# Patient Record
Sex: FEMALE | Race: WHITE | NOT HISPANIC OR LATINO | Employment: UNEMPLOYED | URBAN - METROPOLITAN AREA
[De-identification: names, ages, dates, MRNs, and addresses within clinical notes are randomized per-mention and may not be internally consistent; named-entity substitution may affect disease eponyms.]

---

## 2020-12-18 ENCOUNTER — TELEPHONE (OUTPATIENT)
Dept: CARDIOLOGY | Facility: CLINIC | Age: 25
End: 2020-12-18

## 2020-12-18 NOTE — TELEPHONE ENCOUNTER
Received a referral from Ira Philippe for patient with abnormal ECG. Called patient to schedule appointment, scheduled on 01/12/2021 at 1500.  RADHA ANDINO LPN

## 2021-01-12 ENCOUNTER — OFFICE VISIT (OUTPATIENT)
Dept: CARDIOLOGY | Facility: CLINIC | Age: 26
End: 2021-01-12
Payer: MEDICAID

## 2021-01-12 VITALS
HEART RATE: 89 BPM | SYSTOLIC BLOOD PRESSURE: 119 MMHG | DIASTOLIC BLOOD PRESSURE: 64 MMHG | WEIGHT: 110.81 LBS | OXYGEN SATURATION: 100 % | BODY MASS INDEX: 20.39 KG/M2 | TEMPERATURE: 98 F | HEIGHT: 62 IN | RESPIRATION RATE: 13 BRPM

## 2021-01-12 DIAGNOSIS — R42 ORTHOSTATIC DIZZINESS: ICD-10-CM

## 2021-01-12 DIAGNOSIS — G47.19 EXCESSIVE DAYTIME SLEEPINESS: ICD-10-CM

## 2021-01-12 DIAGNOSIS — R51.9 CHRONIC DAILY HEADACHE: ICD-10-CM

## 2021-01-12 DIAGNOSIS — R53.82 CHRONIC FATIGUE: Primary | ICD-10-CM

## 2021-01-12 DIAGNOSIS — F12.20 MARIJUANA DEPENDENCE: ICD-10-CM

## 2021-01-12 DIAGNOSIS — F90.0 ATTENTION DEFICIT HYPERACTIVITY DISORDER (ADHD), PREDOMINANTLY INATTENTIVE TYPE: ICD-10-CM

## 2021-01-12 DIAGNOSIS — R63.4 UNEXPLAINED WEIGHT LOSS: ICD-10-CM

## 2021-01-12 DIAGNOSIS — Z79.1 NSAID LONG-TERM USE: ICD-10-CM

## 2021-01-12 PROCEDURE — 93005 ELECTROCARDIOGRAM TRACING: CPT | Mod: PBBFAC,PN | Performed by: INTERNAL MEDICINE

## 2021-01-12 PROCEDURE — 99999 PR PBB SHADOW E&M-EST. PATIENT-LVL IV: ICD-10-PCS | Mod: PBBFAC,,, | Performed by: INTERNAL MEDICINE

## 2021-01-12 PROCEDURE — 99999 PR PBB SHADOW E&M-EST. PATIENT-LVL IV: CPT | Mod: PBBFAC,,, | Performed by: INTERNAL MEDICINE

## 2021-01-12 PROCEDURE — 99244 PR OFFICE CONSULTATION,LEVEL IV: ICD-10-PCS | Mod: S$PBB,,, | Performed by: INTERNAL MEDICINE

## 2021-01-12 PROCEDURE — 93010 EKG 12-LEAD: ICD-10-PCS | Mod: S$PBB,,, | Performed by: INTERNAL MEDICINE

## 2021-01-12 PROCEDURE — 99244 OFF/OP CNSLTJ NEW/EST MOD 40: CPT | Mod: S$PBB,,, | Performed by: INTERNAL MEDICINE

## 2021-01-12 PROCEDURE — 99214 OFFICE O/P EST MOD 30 MIN: CPT | Mod: PBBFAC,PN | Performed by: INTERNAL MEDICINE

## 2021-01-12 PROCEDURE — 93010 ELECTROCARDIOGRAM REPORT: CPT | Mod: S$PBB,,, | Performed by: INTERNAL MEDICINE

## 2021-01-12 RX ORDER — DEXTROAMPHETAMINE SACCHARATE, AMPHETAMINE ASPARTATE, DEXTROAMPHETAMINE SULFATE AND AMPHETAMINE SULFATE 7.5; 7.5; 7.5; 7.5 MG/1; MG/1; MG/1; MG/1
TABLET ORAL
COMMUNITY
Start: 2020-10-17 | End: 2023-04-06

## 2021-09-08 ENCOUNTER — TELEPHONE (OUTPATIENT)
Dept: OBSTETRICS AND GYNECOLOGY | Facility: CLINIC | Age: 26
End: 2021-09-08

## 2021-09-08 RX ORDER — FLUCONAZOLE 150 MG/1
TABLET ORAL
Qty: 2 TABLET | Refills: 1 | Status: SHIPPED | OUTPATIENT
Start: 2021-09-08 | End: 2023-04-06

## 2021-09-09 ENCOUNTER — TELEPHONE (OUTPATIENT)
Dept: OBSTETRICS AND GYNECOLOGY | Facility: CLINIC | Age: 26
End: 2021-09-09

## 2021-09-14 ENCOUNTER — OFFICE VISIT (OUTPATIENT)
Dept: OBSTETRICS AND GYNECOLOGY | Facility: CLINIC | Age: 26
End: 2021-09-14
Payer: MEDICAID

## 2021-09-14 ENCOUNTER — LAB VISIT (OUTPATIENT)
Dept: LAB | Facility: HOSPITAL | Age: 26
End: 2021-09-14
Attending: NURSE PRACTITIONER
Payer: MEDICAID

## 2021-09-14 VITALS
BODY MASS INDEX: 20.61 KG/M2 | HEIGHT: 62 IN | DIASTOLIC BLOOD PRESSURE: 82 MMHG | WEIGHT: 112 LBS | SYSTOLIC BLOOD PRESSURE: 124 MMHG

## 2021-09-14 DIAGNOSIS — Z00.00 ANNUAL VISIT FOR GENERAL ADULT MEDICAL EXAMINATION WITHOUT ABNORMAL FINDINGS: ICD-10-CM

## 2021-09-14 DIAGNOSIS — F32.A DEPRESSION, UNSPECIFIED DEPRESSION TYPE: ICD-10-CM

## 2021-09-14 DIAGNOSIS — Z00.00 ANNUAL VISIT FOR GENERAL ADULT MEDICAL EXAMINATION WITHOUT ABNORMAL FINDINGS: Primary | ICD-10-CM

## 2021-09-14 LAB
ESTIMATED AVG GLUCOSE: 103 MG/DL (ref 68–131)
HBA1C MFR BLD: 5.2 % (ref 4–5.6)

## 2021-09-14 PROCEDURE — 86803 HEPATITIS C AB TEST: CPT | Performed by: OBSTETRICS & GYNECOLOGY

## 2021-09-14 PROCEDURE — 87389 HIV-1 AG W/HIV-1&-2 AB AG IA: CPT | Performed by: OBSTETRICS & GYNECOLOGY

## 2021-09-14 PROCEDURE — 87340 HEPATITIS B SURFACE AG IA: CPT | Performed by: OBSTETRICS & GYNECOLOGY

## 2021-09-14 PROCEDURE — 86592 SYPHILIS TEST NON-TREP QUAL: CPT | Performed by: OBSTETRICS & GYNECOLOGY

## 2021-09-14 PROCEDURE — 87624 HPV HI-RISK TYP POOLED RSLT: CPT | Performed by: OBSTETRICS & GYNECOLOGY

## 2021-09-14 PROCEDURE — 87481 CANDIDA DNA AMP PROBE: CPT | Mod: 59 | Performed by: OBSTETRICS & GYNECOLOGY

## 2021-09-14 PROCEDURE — 87491 CHLMYD TRACH DNA AMP PROBE: CPT | Mod: 59 | Performed by: OBSTETRICS & GYNECOLOGY

## 2021-09-14 PROCEDURE — 83036 HEMOGLOBIN GLYCOSYLATED A1C: CPT | Performed by: OBSTETRICS & GYNECOLOGY

## 2021-09-14 PROCEDURE — 88175 CYTOPATH C/V AUTO FLUID REDO: CPT | Performed by: OBSTETRICS & GYNECOLOGY

## 2021-09-14 PROCEDURE — 36415 COLL VENOUS BLD VENIPUNCTURE: CPT | Performed by: OBSTETRICS & GYNECOLOGY

## 2021-09-14 PROCEDURE — 99395 PREV VISIT EST AGE 18-39: CPT | Mod: S$GLB,,, | Performed by: OBSTETRICS & GYNECOLOGY

## 2021-09-14 PROCEDURE — 87661 TRICHOMONAS VAGINALIS AMPLIF: CPT | Mod: 59 | Performed by: OBSTETRICS & GYNECOLOGY

## 2021-09-14 PROCEDURE — 87591 N.GONORRHOEAE DNA AMP PROB: CPT | Performed by: OBSTETRICS & GYNECOLOGY

## 2021-09-14 PROCEDURE — 99395 PR PREVENTIVE VISIT,EST,18-39: ICD-10-PCS | Mod: S$GLB,,, | Performed by: OBSTETRICS & GYNECOLOGY

## 2021-09-14 RX ORDER — NORETHINDRONE ACETATE AND ETHINYL ESTRADIOL 1.5-30(21)
1 KIT ORAL DAILY
Qty: 30 TABLET | Refills: 11 | Status: SHIPPED | OUTPATIENT
Start: 2021-09-14 | End: 2023-01-30 | Stop reason: SDUPTHER

## 2021-09-14 RX ORDER — ESCITALOPRAM OXALATE 10 MG/1
10 TABLET ORAL DAILY
Qty: 30 TABLET | Refills: 2 | Status: SHIPPED | OUTPATIENT
Start: 2021-09-14 | End: 2023-04-06

## 2021-09-15 LAB
HBV SURFACE AG SERPL QL IA: NEGATIVE
HCV AB SERPL QL IA: NEGATIVE
HIV 1+2 AB+HIV1 P24 AG SERPL QL IA: NEGATIVE
RPR SER QL: NORMAL

## 2021-09-16 ENCOUNTER — TELEPHONE (OUTPATIENT)
Dept: OBSTETRICS AND GYNECOLOGY | Facility: CLINIC | Age: 26
End: 2021-09-16

## 2021-09-16 LAB
BACTERIAL VAGINOSIS DNA: NEGATIVE
C TRACH DNA SPEC QL NAA+PROBE: NOT DETECTED
CANDIDA GLABRATA DNA: NEGATIVE
CANDIDA KRUSEI DNA: NEGATIVE
CANDIDA RRNA VAG QL PROBE: NEGATIVE
N GONORRHOEA DNA SPEC QL NAA+PROBE: NOT DETECTED
T VAGINALIS RRNA GENITAL QL PROBE: NEGATIVE

## 2021-09-23 ENCOUNTER — PATIENT MESSAGE (OUTPATIENT)
Dept: OBSTETRICS AND GYNECOLOGY | Facility: CLINIC | Age: 26
End: 2021-09-23

## 2021-09-23 LAB
FINAL PATHOLOGIC DIAGNOSIS: NORMAL
Lab: NORMAL

## 2021-09-24 ENCOUNTER — TELEPHONE (OUTPATIENT)
Dept: OBSTETRICS AND GYNECOLOGY | Facility: CLINIC | Age: 26
End: 2021-09-24

## 2021-09-27 LAB
HPV HR 12 DNA SPEC QL NAA+PROBE: NEGATIVE
HPV16 AG SPEC QL: NEGATIVE
HPV18 DNA SPEC QL NAA+PROBE: NEGATIVE

## 2022-02-17 ENCOUNTER — TELEPHONE (OUTPATIENT)
Dept: OBSTETRICS AND GYNECOLOGY | Facility: CLINIC | Age: 27
End: 2022-02-17
Payer: MEDICAID

## 2022-02-17 NOTE — TELEPHONE ENCOUNTER
----- Message from Luz Lala sent at 2/17/2022 10:38 AM CST -----  Type:  RX Refill Request    Who Called:  Pt  Refill or New Rx:  Refill  RX Name and Strength:  norethindrone-ethinyl estradiol-iron (MICROGESTIN FE1.5/30) 1.5 mg-30 mcg (21)/75 mg (7) tablet  How is the patient currently taking it? (ex. 1XDay):  As directed  Is this a 30 day or 90 day RX:  30  Preferred Pharmacy with phone number:   [facility] - [place], [place] - [address]  [address]  [place] [address]  Phone: [phone] Fax: [phone]  Local or Mail Order:  Local  Ordering Provider:  Paloma Bland Call Back Number:  [phone]  Additional Information:  Pt has been out since Monday 02/14 and is requesting return call to advise on 3 day gap--please advise--Thank you

## 2022-02-18 ENCOUNTER — TELEPHONE (OUTPATIENT)
Dept: OBSTETRICS AND GYNECOLOGY | Facility: CLINIC | Age: 27
End: 2022-02-18
Payer: MEDICAID

## 2022-02-18 NOTE — TELEPHONE ENCOUNTER
Birth Control refilled per Dr. Dow. Pt notified and spoke to Cathy Philippe about starting her pack because she missed a few days. Pt ADAMARIS.   
No

## 2022-08-29 ENCOUNTER — TELEPHONE (OUTPATIENT)
Dept: OBSTETRICS AND GYNECOLOGY | Facility: CLINIC | Age: 27
End: 2022-08-29
Payer: MEDICAID
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
## 2023-01-25 ENCOUNTER — PATIENT MESSAGE (OUTPATIENT)
Dept: OBSTETRICS AND GYNECOLOGY | Facility: CLINIC | Age: 28
End: 2023-01-25
Payer: MEDICAID

## 2023-01-25 ENCOUNTER — TELEPHONE (OUTPATIENT)
Dept: OBSTETRICS AND GYNECOLOGY | Facility: CLINIC | Age: 28
End: 2023-01-25
Payer: MEDICAID

## 2023-01-25 NOTE — TELEPHONE ENCOUNTER
----- Message from Cristel Jean-Baptiste sent at 2023 10:50 AM CST -----  Contact: PT  Type:  RX Refill Request    Who Called: PT   Refill or New Rx:REFILL   RX Name and Strength:  EScitalopram oxalate (LEXAPRO) 10 MG tablet -     How is the patient currently taking it? (ex. 1XDay): ONCE A DAY  Is this a 30 day or 90 day RX:90  Preferred Pharmacy with phone number:  [facility] - [place], [place] - [address]  [address]  [place] [address]  Phone: [phone] Fax: [phone]    Local or Mail Order:LOCAL  Ordering Provider:GERARD   Would the patient rather a call back or a response via [facility]? CALL   Best Call Back Number:[phone] (home)     Additional Information: RX IS  PLEASE REFILL OR CALL PT TO ADVISE. THANK YOU

## 2023-01-30 ENCOUNTER — OFFICE VISIT (OUTPATIENT)
Dept: OBSTETRICS AND GYNECOLOGY | Facility: CLINIC | Age: 28
End: 2023-01-30
Payer: MEDICAID

## 2023-01-30 VITALS
DIASTOLIC BLOOD PRESSURE: 54 MMHG | WEIGHT: 111.38 LBS | HEIGHT: 62 IN | BODY MASS INDEX: 20.5 KG/M2 | SYSTOLIC BLOOD PRESSURE: 102 MMHG

## 2023-01-30 DIAGNOSIS — Z00.00 ANNUAL VISIT FOR GENERAL ADULT MEDICAL EXAMINATION WITHOUT ABNORMAL FINDINGS: ICD-10-CM

## 2023-01-30 DIAGNOSIS — Z01.419 ENCOUNTER FOR ANNUAL ROUTINE GYNECOLOGICAL EXAMINATION: Primary | ICD-10-CM

## 2023-01-30 PROCEDURE — 3008F PR BODY MASS INDEX (BMI) DOCUMENTED: ICD-10-PCS | Mod: CPTII,S$GLB,, | Performed by: OBSTETRICS & GYNECOLOGY

## 2023-01-30 PROCEDURE — 3074F SYST BP LT 130 MM HG: CPT | Mod: CPTII,S$GLB,, | Performed by: OBSTETRICS & GYNECOLOGY

## 2023-01-30 PROCEDURE — 87591 N.GONORRHOEAE DNA AMP PROB: CPT | Performed by: OBSTETRICS & GYNECOLOGY

## 2023-01-30 PROCEDURE — 99395 PR PREVENTIVE VISIT,EST,18-39: ICD-10-PCS | Mod: S$GLB,,, | Performed by: OBSTETRICS & GYNECOLOGY

## 2023-01-30 PROCEDURE — 88141 PR  CYTOPATH CERV/VAG INTERPRET: ICD-10-PCS | Mod: ,,, | Performed by: PATHOLOGY

## 2023-01-30 PROCEDURE — 81514 NFCT DS BV&VAGINITIS DNA ALG: CPT | Performed by: OBSTETRICS & GYNECOLOGY

## 2023-01-30 PROCEDURE — 1159F PR MEDICATION LIST DOCUMENTED IN MEDICAL RECORD: ICD-10-PCS | Mod: CPTII,S$GLB,, | Performed by: OBSTETRICS & GYNECOLOGY

## 2023-01-30 PROCEDURE — 88142 CYTOPATH C/V THIN LAYER: CPT | Performed by: PATHOLOGY

## 2023-01-30 PROCEDURE — 3078F PR MOST RECENT DIASTOLIC BLOOD PRESSURE < 80 MM HG: ICD-10-PCS | Mod: CPTII,S$GLB,, | Performed by: OBSTETRICS & GYNECOLOGY

## 2023-01-30 PROCEDURE — 99395 PREV VISIT EST AGE 18-39: CPT | Mod: S$GLB,,, | Performed by: OBSTETRICS & GYNECOLOGY

## 2023-01-30 PROCEDURE — 3008F BODY MASS INDEX DOCD: CPT | Mod: CPTII,S$GLB,, | Performed by: OBSTETRICS & GYNECOLOGY

## 2023-01-30 PROCEDURE — 88141 CYTOPATH C/V INTERPRET: CPT | Mod: ,,, | Performed by: PATHOLOGY

## 2023-01-30 PROCEDURE — 3078F DIAST BP <80 MM HG: CPT | Mod: CPTII,S$GLB,, | Performed by: OBSTETRICS & GYNECOLOGY

## 2023-01-30 PROCEDURE — 1159F MED LIST DOCD IN RCRD: CPT | Mod: CPTII,S$GLB,, | Performed by: OBSTETRICS & GYNECOLOGY

## 2023-01-30 PROCEDURE — 3074F PR MOST RECENT SYSTOLIC BLOOD PRESSURE < 130 MM HG: ICD-10-PCS | Mod: CPTII,S$GLB,, | Performed by: OBSTETRICS & GYNECOLOGY

## 2023-01-30 RX ORDER — NORETHINDRONE ACETATE AND ETHINYL ESTRADIOL 1.5-30(21)
1 KIT ORAL DAILY
Qty: 30 TABLET | Refills: 11 | Status: SHIPPED | OUTPATIENT
Start: 2023-01-30 | End: 2024-01-30

## 2023-01-30 NOTE — PROGRESS NOTES
CC: Well woman exam    Tory Dale is a 27 y.o. female  presents for well woman exam.  LMP: No LMP recorded (lmp unknown)..  No issues, problems, or complaints. Patients last pap was 21 WNL.  Last wellness labs were done a year ago.  She is a non smoker . Denies any anxiety and depression. She uses a seat belt while riding in the car.  Eating well and exercising.  yes sexually active.The current method of family planning is OCP (estrogen/progesterone).       Chief Complaint   Patient presents with    Annual Exam     Pt is here for an annual exam visit. Pt is requesting a refill on birth control pills        Past Medical History:   Diagnosis Date    HPV (human papilloma virus) anogenital infection      Past Surgical History:   Procedure Laterality Date    Breast inplants       Social History     Socioeconomic History    Marital status:    Tobacco Use    Smoking status: Never    Smokeless tobacco: Never   Substance and Sexual Activity    Alcohol use: Never    Drug use: Yes     Types: Marijuana    Sexual activity: Yes     Birth control/protection: Implant     Family History   Problem Relation Age of Onset    Hypertension Mother     Hypertension Father      OB History          3    Para   3    Term   2       1    AB        Living   3         SAB        IAB        Ectopic        Multiple        Live Births   3               Current Outpatient Medications on File Prior to Visit   Medication Sig Dispense Refill    dextroamphetamine-amphetamine 30 mg Tab TAKE 1/2 TABLET BY MOUTH THREE TIMES DAILY.      EScitalopram oxalate (LEXAPRO) 10 MG tablet Take 1 tablet (10 mg total) by mouth once daily. 30 tablet 2    fluconazole (DIFLUCAN) 150 MG Tab 1 po Now may repeat in 72 hr (Patient not taking: Reported on 2021) 2 tablet 1    [DISCONTINUED] norethindrone-ethinyl estradiol-iron (MICROGESTIN FE1.5/30) 1.5 mg-30 mcg (21)/75 mg (7) tablet Take 1 tablet by mouth once daily. 30 tablet 11  "    No current facility-administered medications on file prior to visit.        ROS:  Review of Systems   Constitutional:  Negative for fatigue, fever and unexpected weight change.   HENT:  Negative for nasal congestion.    Respiratory:  Negative for cough and shortness of breath.    Cardiovascular:  Negative for chest pain, palpitations and leg swelling.   Gastrointestinal:  Negative for abdominal pain, constipation, diarrhea, nausea, vomiting and reflux.   Endocrine: Negative for diabetes, hair loss and hot flashes.   Genitourinary:  Negative for bladder incontinence, decreased libido, dysmenorrhea, dyspareunia, dysuria, hot flashes, menorrhagia, menstrual problem, pelvic pain, vaginal discharge and vaginal dryness.   Musculoskeletal:  Negative for arthralgias, back pain and myalgias.   Integumentary:  Negative for rash, acne, hair changes, mole/lesion, breast mass, nipple discharge, breast skin changes and breast tenderness.   Neurological:  Negative for seizures, syncope and headaches.   Hematological:  Negative for adenopathy. Does not bruise/bleed easily.   Psychiatric/Behavioral:  Negative for depression and sleep disturbance. The patient is not nervous/anxious.    Breast: Negative for breast self exam, lump, mass, mastodynia, nipple discharge, skin changes and tenderness     BP (!) 102/54   Ht 5' 2" (1.575 m)   Wt 50.5 kg (111 lb 6.4 oz)   LMP  (LMP Unknown) Comment: 2 weeks ago  BMI 20.38 kg/m²     PHYSICAL EXAM:  Physical Exam:   Constitutional: She is oriented to person, place, and time. She appears well-developed and well-nourished. No distress.    HENT:   Head: Normocephalic and atraumatic.   Nose: Nose normal.    Eyes: Pupils are equal, round, and reactive to light. Conjunctivae and EOM are normal.    Neck: No thyromegaly present.    Cardiovascular:  Normal rate, regular rhythm and normal heart sounds.      Exam reveals no clubbing, no cyanosis and no edema.        Pulmonary/Chest: Effort normal " and breath sounds normal. She has no wheezes.        Abdominal: Soft. Bowel sounds are normal. She exhibits no distension and no mass. There is no abdominal tenderness. There is no rebound and no guarding.     Genitourinary:    Inguinal canal, vagina, uterus, right adnexa and left adnexa normal.   Labial bartholins normal.There is no rash or lesion on the right labia. There is no rash or lesion on the left labia. Cervix is normal. Right adnexum displays no mass and no tenderness. Left adnexum displays no mass and no tenderness. No  no vaginal discharge, tenderness, rectocele, cystocele or unspecified prolapse of vaginal walls in the vagina. Cervix exhibits no motion tenderness and no discharge.           Musculoskeletal: Normal range of motion and moves all extremeties. No edema.       Neurological: She is alert and oriented to person, place, and time. She has normal reflexes.    Skin: Skin is warm and dry. No rash noted. No cyanosis. Nails show no clubbing.    Psychiatric: She has a normal mood and affect. Her behavior is normal. Judgment and thought content normal.      Encounter for annual routine gynecological examination  -     Liquid-Based Pap Smear, Screening  -     C. trachomatis/N. gonorrhoeae by AMP DNA [facility]; Vagina  -     Vaginosis Screen by DNA Probe    Annual visit for general adult medical examination without abnormal findings  -     norethindrone-ethinyl estradiol-iron (MICROGESTIN FE1.5/30) 1.5 mg-30 mcg (21)/75 mg (7) tablet; Take 1 tablet by mouth once daily.  Dispense: 30 tablet; Refill: 11      Orders Placed This Encounter   Procedures    C. trachomatis/N. gonorrhoeae by AMP DNA [facility]; Vagina     Sources by Resulting Lab:->[facility]     Order Specific Question:   Sources by Resulting Lab:     Answer:   [facility]     Order Specific Question:   Source:     Answer:   Vagina    Vaginosis Screen by DNA Probe     Release to patient->Immediate     Order Specific Question:   Release to patient      Answer:   Immediate        Patient was counseled today on A.C.S. Pap guidelines and recommendations for yearly pelvic exams, mammograms and monthly self breast exams; to see her PCP for other health maintenance.  Contraception was discussed with the patient she expressed understanding.  STD education counseling was performed during this visit patient expressed understanding.    No follow-ups on file.

## 2023-02-01 LAB
C TRACH DNA SPEC QL NAA+PROBE: NOT DETECTED
N GONORRHOEA DNA SPEC QL NAA+PROBE: NOT DETECTED

## 2023-02-04 LAB
BACTERIAL VAGINOSIS DNA: NEGATIVE
CANDIDA GLABRATA DNA: NEGATIVE
CANDIDA KRUSEI DNA: NEGATIVE
CANDIDA RRNA VAG QL PROBE: NEGATIVE
T VAGINALIS RRNA GENITAL QL PROBE: NEGATIVE

## 2023-02-06 LAB
FINAL PATHOLOGIC DIAGNOSIS: NORMAL
Lab: NORMAL

## 2023-04-06 ENCOUNTER — OFFICE VISIT (OUTPATIENT)
Dept: OBSTETRICS AND GYNECOLOGY | Facility: CLINIC | Age: 28
End: 2023-04-06
Payer: MEDICAID

## 2023-04-06 VITALS
HEIGHT: 62 IN | BODY MASS INDEX: 20.36 KG/M2 | DIASTOLIC BLOOD PRESSURE: 96 MMHG | SYSTOLIC BLOOD PRESSURE: 144 MMHG | WEIGHT: 110.63 LBS

## 2023-04-06 DIAGNOSIS — N92.0 MENORRHAGIA WITH REGULAR CYCLE: Primary | ICD-10-CM

## 2023-04-06 PROBLEM — F98.8 ATTENTION DEFICIT DISORDER (ADD) WITHOUT HYPERACTIVITY: Status: ACTIVE | Noted: 2021-01-12

## 2023-04-06 PROBLEM — Z79.1 NSAID LONG-TERM USE: Status: RESOLVED | Noted: 2021-01-12 | Resolved: 2023-04-06

## 2023-04-06 PROBLEM — F90.0 ATTENTION DEFICIT HYPERACTIVITY DISORDER (ADHD), PREDOMINANTLY INATTENTIVE TYPE: Status: RESOLVED | Noted: 2021-01-12 | Resolved: 2023-04-06

## 2023-04-06 PROBLEM — R63.4 UNEXPLAINED WEIGHT LOSS: Status: RESOLVED | Noted: 2021-01-12 | Resolved: 2023-04-06

## 2023-04-06 PROBLEM — G47.19 EXCESSIVE DAYTIME SLEEPINESS: Status: RESOLVED | Noted: 2021-01-12 | Resolved: 2023-04-06

## 2023-04-06 PROCEDURE — 3077F SYST BP >= 140 MM HG: CPT | Mod: CPTII,S$GLB,,

## 2023-04-06 PROCEDURE — 1159F MED LIST DOCD IN RCRD: CPT | Mod: CPTII,S$GLB,,

## 2023-04-06 PROCEDURE — 3080F DIAST BP >= 90 MM HG: CPT | Mod: CPTII,S$GLB,,

## 2023-04-06 PROCEDURE — 1159F PR MEDICATION LIST DOCUMENTED IN MEDICAL RECORD: ICD-10-PCS | Mod: CPTII,S$GLB,,

## 2023-04-06 PROCEDURE — 3008F PR BODY MASS INDEX (BMI) DOCUMENTED: ICD-10-PCS | Mod: CPTII,S$GLB,,

## 2023-04-06 PROCEDURE — 99213 OFFICE O/P EST LOW 20 MIN: CPT | Mod: S$GLB,,,

## 2023-04-06 PROCEDURE — 99213 PR OFFICE/OUTPT VISIT, EST, LEVL III, 20-29 MIN: ICD-10-PCS | Mod: S$GLB,,,

## 2023-04-06 PROCEDURE — 3080F PR MOST RECENT DIASTOLIC BLOOD PRESSURE >= 90 MM HG: ICD-10-PCS | Mod: CPTII,S$GLB,,

## 2023-04-06 PROCEDURE — 3077F PR MOST RECENT SYSTOLIC BLOOD PRESSURE >= 140 MM HG: ICD-10-PCS | Mod: CPTII,S$GLB,,

## 2023-04-06 PROCEDURE — 3008F BODY MASS INDEX DOCD: CPT | Mod: CPTII,S$GLB,,

## 2023-04-06 RX ORDER — TRANEXAMIC ACID 650 MG/1
1300 TABLET ORAL 3 TIMES DAILY
Qty: 30 TABLET | Refills: 0 | Status: SHIPPED | OUTPATIENT
Start: 2023-04-06 | End: 2023-04-11

## 2023-04-06 NOTE — PROGRESS NOTES
HISTORY OF PRESENT ILLNESS:    Tory Dale is a 27 y.o. female, , Patient's last menstrual period was 2023 (approximate).,  presents for a problem visit, complaining of heavy period x 1 month. Patient is currently taking  OCPs. She states she is doing well with these. However last month her period began and it has yet to end. PMH notable for previous HPV infection and history of ovarian cysts. She was seen for wellness exam in 2023 she had negative gonorrhea, chlamydia, trichomonas testing. Normal pap smear.     Patient denies soaking through pads in less than 1 hour. She is not passing large clots, no LOC.     Patient has used Nexplanon in the past with reaction of constant bleeding. Given her history of poor response to progestin. We discussed lysteda to stop bleeding for now. A TVUS was ordered to assess for structural causes of bleeding and labs were ordered. Patient will begin taking lysteda today and follow up in 1 week for labs. Patient is agreeable to plan of care.     Past Medical History:   Diagnosis Date    HPV (human papilloma virus) anogenital infection        Past Surgical History:   Procedure Laterality Date    Breast inplants         MEDICATIONS AND ALLERGIES:      Current Outpatient Medications:     norethindrone-ethinyl estradiol-iron (MICROGESTIN FE1.5/30) 1.5 mg-30 mcg (21)/75 mg (7) tablet, Take 1 tablet by mouth once daily., Disp: 30 tablet, Rfl: 11    tranexamic acid (LYSTEDA) 650 mg tablet, Take 2 tablets (1,300 mg total) by mouth 3 (three) times daily. for 5 days, Disp: 30 tablet, Rfl: 0    Review of patient's allergies indicates:  No Known Allergies    Family History   Problem Relation Age of Onset    Hypertension Mother     Hypertension Father        Social History     Socioeconomic History    Marital status:    Tobacco Use    Smoking status: Never    Smokeless tobacco: Never   Substance and Sexual Activity    Alcohol use: Yes     Comment: socially     "Drug use: Yes     Types: Marijuana    Sexual activity: Yes     Partners: Male     Birth control/protection: OCP       ROS:  GENERAL: No weight changes. No swelling. No fatigue. No fever.  CARDIOVASCULAR: No chest pain. No shortness of breath. No leg cramps.   NEUROLOGICAL: No headaches. No vision changes.  BREASTS: No pain. No lumps. No discharge.  ABDOMEN: No pain. No nausea. No vomiting. No diarrhea. No constipation.  REPRODUCTIVE: + abnormal bleeding.   VULVA: No pain. No lesions. No itching.  VAGINA: No relaxation. No itching. No odor. No discharge. No lesions.  URINARY: No incontinence. No nocturia. No frequency. No dysuria.    BP (!) 144/96   Ht 5' 2" (1.575 m)   Wt 50.2 kg (110 lb 9.6 oz)   LMP 03/06/2023 (Approximate)   BMI 20.23 kg/m²     PE:  APPEARANCE: Well nourished, well developed, in no acute distress.  ABDOMEN: Soft. No tenderness or masses. No hepatosplenomegaly. No hernias.  BREASTS, FUNDOSCOPIC, RECTAL DEFERRED  PELVIC: Deferred  EXTREMITIES: No edema      DIAGNOSIS & PLAN  1. Menorrhagia with regular cycle  tranexamic acid (LYSTEDA) 650 mg tablet    TSH    T4, free    T3    CBC Auto Differential    Follicle Stimulating Hormone    Progesterone    Estradiol    US OB/GYN Procedure ([facility])-Future    HCG, Quantitative          Follow up in 1 week    "

## 2023-04-18 ENCOUNTER — LAB VISIT (OUTPATIENT)
Dept: LAB | Facility: CLINIC | Age: 28
End: 2023-04-18
Payer: MEDICAID

## 2023-04-18 ENCOUNTER — OFFICE VISIT (OUTPATIENT)
Dept: OBSTETRICS AND GYNECOLOGY | Facility: CLINIC | Age: 28
End: 2023-04-18
Payer: MEDICAID

## 2023-04-18 VITALS
BODY MASS INDEX: 20.83 KG/M2 | HEART RATE: 99 BPM | HEIGHT: 62 IN | WEIGHT: 113.19 LBS | SYSTOLIC BLOOD PRESSURE: 137 MMHG | DIASTOLIC BLOOD PRESSURE: 93 MMHG

## 2023-04-18 DIAGNOSIS — N93.9 ABNORMAL UTERINE BLEEDING: ICD-10-CM

## 2023-04-18 DIAGNOSIS — N92.0 MENORRHAGIA WITH REGULAR CYCLE: ICD-10-CM

## 2023-04-18 DIAGNOSIS — N92.0 MENORRHAGIA WITH REGULAR CYCLE: Primary | ICD-10-CM

## 2023-04-18 PROCEDURE — 3080F DIAST BP >= 90 MM HG: CPT | Mod: CPTII,S$GLB,,

## 2023-04-18 PROCEDURE — 84439 ASSAY OF FREE THYROXINE: CPT

## 2023-04-18 PROCEDURE — 3075F SYST BP GE 130 - 139MM HG: CPT | Mod: CPTII,S$GLB,,

## 2023-04-18 PROCEDURE — 85025 COMPLETE CBC W/AUTO DIFF WBC: CPT

## 2023-04-18 PROCEDURE — 3008F PR BODY MASS INDEX (BMI) DOCUMENTED: ICD-10-PCS | Mod: CPTII,S$GLB,,

## 2023-04-18 PROCEDURE — 3080F PR MOST RECENT DIASTOLIC BLOOD PRESSURE >= 90 MM HG: ICD-10-PCS | Mod: CPTII,S$GLB,,

## 2023-04-18 PROCEDURE — 3075F PR MOST RECENT SYSTOLIC BLOOD PRESS GE 130-139MM HG: ICD-10-PCS | Mod: CPTII,S$GLB,,

## 2023-04-18 PROCEDURE — 1159F MED LIST DOCD IN RCRD: CPT | Mod: CPTII,S$GLB,,

## 2023-04-18 PROCEDURE — 1159F PR MEDICATION LIST DOCUMENTED IN MEDICAL RECORD: ICD-10-PCS | Mod: CPTII,S$GLB,,

## 2023-04-18 PROCEDURE — 99213 OFFICE O/P EST LOW 20 MIN: CPT | Mod: S$GLB,,,

## 2023-04-18 PROCEDURE — 99213 PR OFFICE/OUTPT VISIT, EST, LEVL III, 20-29 MIN: ICD-10-PCS | Mod: S$GLB,,,

## 2023-04-18 PROCEDURE — 84443 ASSAY THYROID STIM HORMONE: CPT

## 2023-04-18 PROCEDURE — 3008F BODY MASS INDEX DOCD: CPT | Mod: CPTII,S$GLB,,

## 2023-04-18 PROCEDURE — 84702 CHORIONIC GONADOTROPIN TEST: CPT

## 2023-04-18 NOTE — PROGRESS NOTES
HISTORY OF PRESENT ILLNESS:    Tory Dale is a 27 y.o. female, , Patient's last menstrual period was 2023 (approximate).,  presents for a follow up visit, patient complaining of heavy period x 1 month. She was given Lysteda 1300 mg TID x 5 days. Patient took only one dose of lysteda and has had resolution in bleeding. No further abnormal bleeding. Patient still desires serum labs for abnormal bleeding. CBC, progesterone, FSH, TSH, T4 and Hcg previously ordered. Patient will complete this today. She also has TVUS scheduled for 2023. Patient given the okay to reinitiate her OCPs. I will follow up with patient via telephone once labs and ultrasound results are in. Patient is feeling reassured.     Past Medical History:   Diagnosis Date    HPV (human papilloma virus) anogenital infection        Past Surgical History:   Procedure Laterality Date    Breast inplants         MEDICATIONS AND ALLERGIES:      Current Outpatient Medications:     norethindrone-ethinyl estradiol-iron (MICROGESTIN FE1.5/30) 1.5 mg-30 mcg (21)/75 mg (7) tablet, Take 1 tablet by mouth once daily. (Patient not taking: Reported on 2023), Disp: 30 tablet, Rfl: 11    Review of patient's allergies indicates:  No Known Allergies    Family History   Problem Relation Age of Onset    Hypertension Mother     Hypertension Father        Social History     Socioeconomic History    Marital status:    Tobacco Use    Smoking status: Never    Smokeless tobacco: Never   Substance and Sexual Activity    Alcohol use: Yes     Comment: socially    Drug use: Yes     Types: Marijuana    Sexual activity: Yes     Partners: Male     Birth control/protection: OCP       ROS:  GENERAL: No weight changes. No swelling. No fatigue. No fever.  CARDIOVASCULAR: No chest pain. No shortness of breath. No leg cramps.   NEUROLOGICAL: No headaches. No vision changes.  BREASTS: No pain. No lumps. No discharge.  ABDOMEN: No pain. No nausea. No vomiting.  "No diarrhea. No constipation.  REPRODUCTIVE: no abnormal bleeding.   VULVA: No pain. No lesions. No itching.  VAGINA: No relaxation. No itching. No odor. No discharge. No lesions.  URINARY: No incontinence. No nocturia. No frequency. No dysuria.    BP (!) 137/93   Pulse 99   Ht 5' 2" (1.575 m)   Wt 51.3 kg (113 lb 3.2 oz)   LMP 03/06/2023 (Approximate)   BMI 20.70 kg/m²     PE:  APPEARANCE: Well nourished, well developed, in no acute distress.  ABDOMEN: Soft. No tenderness or masses. No hepatosplenomegaly. No hernias.  BREASTS, FUNDOSCOPIC, RECTAL DEFERRED  PELVIC: Deferred  EXTREMITIES: No edema      DIAGNOSIS & PLAN  1. Menorrhagia with regular cycle        2. Abnormal uterine bleeding              I will follow up with patient once lab results and ultrasound results are in.       "

## 2023-04-19 LAB
BASOPHILS # BLD AUTO: 0.06 K/UL (ref 0–0.2)
BASOPHILS NFR BLD: 0.9 % (ref 0–1.9)
DIFFERENTIAL METHOD: ABNORMAL
EOSINOPHIL # BLD AUTO: 0.1 K/UL (ref 0–0.5)
EOSINOPHIL NFR BLD: 1.3 % (ref 0–8)
ERYTHROCYTE [DISTWIDTH] IN BLOOD BY AUTOMATED COUNT: 11.9 % (ref 11.5–14.5)
ESTRADIOL SERPL-MCNC: 331 PG/ML
FSH SERPL-ACNC: 2.59 MIU/ML
HCG INTACT+B SERPL-ACNC: <1.2 MIU/ML
HCT VFR BLD AUTO: 36.8 % (ref 37–48.5)
HGB BLD-MCNC: 12.5 G/DL (ref 12–16)
IMM GRANULOCYTES # BLD AUTO: 0.01 K/UL (ref 0–0.04)
IMM GRANULOCYTES NFR BLD AUTO: 0.1 % (ref 0–0.5)
LYMPHOCYTES # BLD AUTO: 1.9 K/UL (ref 1–4.8)
LYMPHOCYTES NFR BLD: 27.4 % (ref 18–48)
MCH RBC QN AUTO: 32.1 PG (ref 27–31)
MCHC RBC AUTO-ENTMCNC: 34 G/DL (ref 32–36)
MCV RBC AUTO: 95 FL (ref 82–98)
MONOCYTES # BLD AUTO: 0.3 K/UL (ref 0.3–1)
MONOCYTES NFR BLD: 4.8 % (ref 4–15)
NEUTROPHILS # BLD AUTO: 4.5 K/UL (ref 1.8–7.7)
NEUTROPHILS NFR BLD: 65.5 % (ref 38–73)
NRBC BLD-RTO: 0 /100 WBC
PLATELET # BLD AUTO: 197 K/UL (ref 150–450)
PMV BLD AUTO: 12.2 FL (ref 9.2–12.9)
PROGEST SERPL-MCNC: 0.2 NG/ML
RBC # BLD AUTO: 3.89 M/UL (ref 4–5.4)
T3 SERPL-MCNC: 96 NG/DL (ref 60–180)
T4 FREE SERPL-MCNC: 0.83 NG/DL (ref 0.71–1.51)
TSH SERPL DL<=0.005 MIU/L-ACNC: 2.63 UIU/ML (ref 0.4–4)
WBC # BLD AUTO: 6.85 K/UL (ref 3.9–12.7)

## 2023-04-19 PROCEDURE — 36415 COLL VENOUS BLD VENIPUNCTURE: CPT | Mod: ,,, | Performed by: STUDENT IN AN ORGANIZED HEALTH CARE EDUCATION/TRAINING PROGRAM

## 2023-04-19 PROCEDURE — 83001 ASSAY OF GONADOTROPIN (FSH): CPT

## 2023-04-19 PROCEDURE — 84144 ASSAY OF PROGESTERONE: CPT

## 2023-04-19 PROCEDURE — 82670 ASSAY OF TOTAL ESTRADIOL: CPT

## 2023-04-19 PROCEDURE — 36415 PR COLLECTION VENOUS BLOOD,VENIPUNCTURE: ICD-10-PCS | Mod: ,,, | Performed by: STUDENT IN AN ORGANIZED HEALTH CARE EDUCATION/TRAINING PROGRAM

## 2023-04-19 PROCEDURE — 84480 ASSAY TRIIODOTHYRONINE (T3): CPT

## 2023-04-27 ENCOUNTER — PROCEDURE VISIT (OUTPATIENT)
Dept: MATERNAL FETAL MEDICINE | Facility: CLINIC | Age: 28
End: 2023-04-27
Payer: MEDICAID

## 2023-04-27 DIAGNOSIS — N92.0 MENORRHAGIA WITH REGULAR CYCLE: ICD-10-CM

## 2023-04-27 PROCEDURE — 76856 US OB/GYN PROCEDURE (VIEWPOINT): ICD-10-PCS | Mod: S$GLB,,, | Performed by: OBSTETRICS & GYNECOLOGY

## 2023-04-27 PROCEDURE — 76856 US EXAM PELVIC COMPLETE: CPT | Mod: S$GLB,,, | Performed by: OBSTETRICS & GYNECOLOGY

## 2023-04-27 NOTE — PROGRESS NOTES
Ultrasound done for abnormal uterine bleeding    The uterus is moderately enlarged, measuring 9.3 cm in length, with an endometrial thickness of 4.6 mm.  The left ovary is normal.  The right ovary contains a 1.8 cm simple cyst

## 2024-01-19 ENCOUNTER — OFFICE VISIT (OUTPATIENT)
Dept: URGENT CARE | Facility: CLINIC | Age: 29
End: 2024-01-19
Payer: MEDICAID

## 2024-01-19 VITALS
TEMPERATURE: 99 F | WEIGHT: 110 LBS | DIASTOLIC BLOOD PRESSURE: 68 MMHG | RESPIRATION RATE: 18 BRPM | HEART RATE: 113 BPM | SYSTOLIC BLOOD PRESSURE: 126 MMHG | BODY MASS INDEX: 20.24 KG/M2 | OXYGEN SATURATION: 98 % | HEIGHT: 62 IN

## 2024-01-19 DIAGNOSIS — R05.9 COUGH, UNSPECIFIED TYPE: ICD-10-CM

## 2024-01-19 DIAGNOSIS — Z20.828 EXPOSURE TO THE FLU: ICD-10-CM

## 2024-01-19 DIAGNOSIS — J11.1 INFLUENZA: Primary | ICD-10-CM

## 2024-01-19 LAB
CTP QC/QA: YES
POC MOLECULAR INFLUENZA A AGN: POSITIVE
POC MOLECULAR INFLUENZA B AGN: NEGATIVE

## 2024-01-19 PROCEDURE — 87502 INFLUENZA DNA AMP PROBE: CPT | Mod: QW,,, | Performed by: NURSE PRACTITIONER

## 2024-01-19 PROCEDURE — 99214 OFFICE O/P EST MOD 30 MIN: CPT | Mod: S$GLB,,, | Performed by: NURSE PRACTITIONER

## 2024-01-19 RX ORDER — AZITHROMYCIN 250 MG/1
TABLET, FILM COATED ORAL
Qty: 6 TABLET | Refills: 0 | Status: SHIPPED | OUTPATIENT
Start: 2024-01-19

## 2024-01-19 RX ORDER — OSELTAMIVIR PHOSPHATE 75 MG/1
75 CAPSULE ORAL EVERY 12 HOURS
Qty: 10 CAPSULE | Refills: 0 | Status: SHIPPED | OUTPATIENT
Start: 2024-01-19 | End: 2024-01-24

## 2024-01-19 RX ORDER — PROMETHAZINE HYDROCHLORIDE AND DEXTROMETHORPHAN HYDROBROMIDE 6.25; 15 MG/5ML; MG/5ML
5 SYRUP ORAL EVERY 4 HOURS PRN
Qty: 240 ML | Refills: 0 | Status: SHIPPED | OUTPATIENT
Start: 2024-01-19 | End: 2024-01-29

## 2024-01-19 NOTE — PROGRESS NOTES
"Subjective:       Patient ID: Tory Dale is a 28 y.o. female.    Vitals:  height is 5' 2" (1.575 m) and weight is 49.9 kg (110 lb). Her oral temperature is 99.4 °F (37.4 °C). Her blood pressure is 126/68 and her pulse is 113 (abnormal). Her respiration is 18 and oxygen saturation is 98%.     Chief Complaint: Cough (Cough with clear/brown sputum, back pain from coughing.  States she's been sick x 3 weeks.  Her son had the flu and she got sick, then he tested positive for the other strain of flu and she was sick again.  This time her cough and congestion lingered and now her other son tested positive for the flu x 2 days ago. She would like to test for the flu.)    This is a 28 y.o. female who presents today with a chief complaint of  Cough with clear/brown sputum, back pain from coughing.  States she's been sick x 3 weeks.  Her son had the flu and she got sick, then he tested positive for the other strain of flu and she was sick again.  This time her cough and congestion lingered and now her other son tested positive for the flu x 2 days ago. She would like to test for the flu.    Cough  This is a new problem. The current episode started 1 to 4 weeks ago. The problem has been gradually worsening. The cough is Productive of sputum. Associated symptoms include rhinorrhea. She has tried nothing for the symptoms. The treatment provided no relief.       Respiratory:  Positive for cough.            Objective:      Physical Exam   Constitutional: She is oriented to person, place, and time. normal  HENT:   Head: Normocephalic and atraumatic.   Ears:   Right Ear: Tympanic membrane, external ear and ear canal normal.   Left Ear: Tympanic membrane, external ear and ear canal normal.   Nose: Nose normal.   Mouth/Throat: Mucous membranes are moist. Oropharynx is clear.   Eyes: Conjunctivae are normal. Extraocular movement intact   Neck: Neck supple.   Cardiovascular: Normal rate, regular rhythm, normal heart sounds and " normal pulses.   Pulmonary/Chest: Effort normal and breath sounds normal.   Abdominal: Normal appearance.   Musculoskeletal: Normal range of motion.         General: Normal range of motion.   Neurological: She is alert and oriented to person, place, and time.   Skin: Skin is warm and dry.   Psychiatric: Her behavior is normal.   Vitals reviewed.        Past medical history and current medications reviewed.     Results for orders placed or performed in visit on 01/19/24   POCT Influenza A/B MOLECULAR   Result Value Ref Range    POC Molecular Influenza A Ag Positive (A) Negative, Not Reported    POC Molecular Influenza B Ag Negative Negative, Not Reported     Acceptable Yes     No results found.     Assessment:           1. Influenza    2. Cough, unspecified type    3. Exposure to the flu              Plan:         Influenza  -     azithromycin (Z-ONEL) 250 MG tablet; Take 2 tablets by mouth on day 1; Take 1 tablet by mouth on days 2-5  Dispense: 6 tablet; Refill: 0  -     oseltamivir (TAMIFLU) 75 MG capsule; Take 1 capsule (75 mg total) by mouth every 12 (twelve) hours. for 5 days  Dispense: 10 capsule; Refill: 0    Cough, unspecified type  -     POCT Influenza A/B MOLECULAR  -     promethazine-dextromethorphan (PROMETHAZINE-DM) 6.25-15 mg/5 mL Syrp; Take 5 mLs by mouth every 4 (four) hours as needed (cough).  Dispense: 240 mL; Refill: 0    Exposure to the flu  -     POCT Influenza A/B MOLECULAR           INSTRUCTIONS  Meds as prescribed. Follow up as advised.

## 2024-08-07 ENCOUNTER — OFFICE VISIT (OUTPATIENT)
Dept: FAMILY MEDICINE | Facility: CLINIC | Age: 29
End: 2024-08-07

## 2024-08-07 VITALS
DIASTOLIC BLOOD PRESSURE: 70 MMHG | RESPIRATION RATE: 18 BRPM | HEART RATE: 64 BPM | OXYGEN SATURATION: 98 % | BODY MASS INDEX: 22.78 KG/M2 | HEIGHT: 62 IN | SYSTOLIC BLOOD PRESSURE: 110 MMHG | WEIGHT: 123.81 LBS

## 2024-08-07 DIAGNOSIS — R59.9 REACTIVE LYMPHADENOPATHY: Primary | ICD-10-CM

## 2024-08-07 DIAGNOSIS — L72.0 EPIDERMAL INCLUSION CYST: ICD-10-CM

## 2024-08-07 PROCEDURE — 99213 OFFICE O/P EST LOW 20 MIN: CPT | Mod: PBBFAC,PN | Performed by: FAMILY MEDICINE

## 2024-08-07 PROCEDURE — 99202 OFFICE O/P NEW SF 15 MIN: CPT | Mod: S$PBB,,, | Performed by: FAMILY MEDICINE

## 2024-08-07 PROCEDURE — 99999 PR PBB SHADOW E&M-EST. PATIENT-LVL III: CPT | Mod: PBBFAC,,, | Performed by: FAMILY MEDICINE
